# Patient Record
Sex: MALE | Race: BLACK OR AFRICAN AMERICAN | Employment: FULL TIME | URBAN - METROPOLITAN AREA
[De-identification: names, ages, dates, MRNs, and addresses within clinical notes are randomized per-mention and may not be internally consistent; named-entity substitution may affect disease eponyms.]

---

## 2017-10-05 ENCOUNTER — OFFICE VISIT (OUTPATIENT)
Dept: FAMILY MEDICINE CLINIC | Age: 27
End: 2017-10-05

## 2017-10-05 VITALS
DIASTOLIC BLOOD PRESSURE: 72 MMHG | OXYGEN SATURATION: 99 % | WEIGHT: 125 LBS | HEART RATE: 69 BPM | RESPIRATION RATE: 16 BRPM | SYSTOLIC BLOOD PRESSURE: 113 MMHG | TEMPERATURE: 98.9 F | BODY MASS INDEX: 18.51 KG/M2 | HEIGHT: 69 IN

## 2017-10-05 DIAGNOSIS — M54.2 ACUTE NECK PAIN: ICD-10-CM

## 2017-10-05 DIAGNOSIS — M54.6 ACUTE MIDLINE THORACIC BACK PAIN: ICD-10-CM

## 2017-10-05 DIAGNOSIS — S13.4XXA WHIPLASH INJURIES, INITIAL ENCOUNTER: Primary | ICD-10-CM

## 2017-10-05 RX ORDER — DICLOFENAC SODIUM 75 MG/1
75 TABLET, DELAYED RELEASE ORAL
Qty: 30 TAB | Refills: 0 | Status: SHIPPED | OUTPATIENT
Start: 2017-10-05

## 2017-10-05 RX ORDER — CYCLOBENZAPRINE HCL 10 MG
10 TABLET ORAL
Qty: 30 TAB | Refills: 0 | Status: SHIPPED | OUTPATIENT
Start: 2017-10-05 | End: 2017-12-04 | Stop reason: ALTCHOICE
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
## 2017-10-05 NOTE — PATIENT INSTRUCTIONS
Healthy Upper Back: Exercises  Your Care Instructions  Here are some examples of exercises for your upper back. Start each exercise slowly. Ease off the exercise if you start to have pain. Your doctor or physical therapist will tell you when you can start these exercises and which ones will work best for you. How to do the exercises  Lower neck and upper back stretch    1. Stretch your arms out in front of your body. Clasp one hand on top of your other hand. 2. Gently reach out so that you feel your shoulder blades stretching away from each other. 3. Gently bend your head forward. 4. Hold for 15 to 30 seconds. 5. Repeat 2 to 4 times. Midback stretch    Note: If you have knee pain, do not do this exercise. 1. Kneel on the floor, and sit back on your ankles. 2. Lean forward, place your hands on the floor, and stretch your arms out in front of you. Rest your head between your arms. 3. Gently push your chest toward the floor, reaching as far in front of you as possible. 4. Hold for 15 to 30 seconds. 5. Repeat 2 to 4 times. Shoulder rolls    1. Sit comfortably with your feet shoulder-width apart. You can also do this exercise while standing. 2. Roll your shoulders up, then back, and then down in a smooth, circular motion. 3. Repeat 2 to 4 times. Wall push-up    1. Stand against a wall with your feet about 12 to 24 inches back from the wall. If you feel any pain when you do this exercise, stand closer to the wall. 2. Place your hands on the wall slightly wider apart than your shoulders, and lean forward. 3. Gently lean your body toward the wall. Then push back to your starting position. Keep the motion smooth and controlled. 4. Repeat 8 to 12 times. Resisted shoulder blade squeeze    Note: For this exercise, you will need elastic exercise material, such as surgical tubing or Thera-Band. 1. Sit or stand, holding the band in both hands in front of you.  Keep your elbows close to your sides, bent at a 90-degree angle. Your palms should face up. 2. Squeeze your shoulder blades together, and move your arms to the outside, stretching the band. Be sure to keep your elbows at your sides while you do this. 3. Relax. 4. Repeat 8 to 12 times. Resisted rows    Note: For this exercise, you will need elastic exercise material, such as surgical tubing or Thera-Band. 1. Put the band around a solid object, such as a bedpost, at about waist level. Hold one end of the band in each hand. 2. With your elbows at your sides and bent to 90 degrees, pull the band back to move your shoulder blades toward each other. Return to the starting position. 3. Repeat 8 to 12 times. Follow-up care is a key part of your treatment and safety. Be sure to make and go to all appointments, and call your doctor if you are having problems. It's also a good idea to know your test results and keep a list of the medicines you take. Where can you learn more? Go to http://ramon-cathleen.info/. Enter G884 in the search box to learn more about \"Healthy Upper Back: Exercises. \"  Current as of: March 21, 2017  Content Version: 11.3  © 1168-9612 InteliWISE [employer]. Care instructions adapted under license by MakInnovations (which disclaims liability or warranty for this information). If you have questions about a medical condition or this instruction, always ask your healthcare professional. [place] [address] any warranty or liability for your use of this information. Neck Spasm: Exercises  Your Care Instructions  Here are some examples of typical rehabilitation exercises for your condition. Start each exercise slowly. Ease off the exercise if you start to have pain. Your doctor or physical therapist will tell you when you can start these exercises and which ones will work best for you. How to do the exercises  Levator scapula stretch    6. Sit in a firm chair, or stand up straight.   7. Gently tilt your head toward your left shoulder. 8. Turn your head to look down into your armpit, bending your head slightly forward. Let the weight of your head stretch your neck muscles. 9. Hold for 15 to 30 seconds. 10. Return to your starting position. 11. Follow the same instructions above, but tilt your head toward your right shoulder. 12. Repeat 2 to 4 times toward each shoulder. Upper trapezius stretch    6. Sit in a firm chair, or stand up straight. 7. This stretch works best if you keep your shoulder down as you lean away from it. To help you remember to do this, start by relaxing your shoulders and lightly holding on to your thighs or your chair. 8. Tilt your head toward your shoulder and hold for 15 to 30 seconds. Let the weight of your head stretch your muscles. 9. If you would like a little added stretch, place your arm behind your back. Use the arm opposite of the direction you are tilting your head. For example, if you are tilting your head to the left, place your right arm behind your back. 10. Repeat 2 to 4 times toward each shoulder. Neck rotation    4. Sit in a firm chair, or stand up straight. 5. Keeping your chin level, turn your head to the right, and hold for 15 to 30 seconds. 6. Turn your head to the left, and hold for 15 to 30 seconds. 7. Repeat 2 to 4 times to each side. Chin tuck    5. Lie on the floor with a rolled-up towel under your neck. Your head should be touching the floor. 6. Slowly bring your chin toward the front of your neck. 7. Hold for a count of 6, and then relax for up to 10 seconds. 8. Repeat 8 to 12 times. Forward neck flexion    5. Sit in a firm chair, or stand up straight. 6. Bend your head forward. 7. Hold for 15 to 30 seconds, then return to your starting position. 8. Repeat 2 to 4 times. Follow-up care is a key part of your treatment and safety. Be sure to make and go to all appointments, and call your doctor if you are having problems.  It's also a good idea to know your test results and keep a list of the medicines you take. Where can you learn more? Go to http://ramon-cathleen.info/. Enter P962 in the search box to learn more about \"Neck Spasm: Exercises. \"  Current as of: March 21, 2017  Content Version: 11.3  © 6448-4430 TrewCap. Care instructions adapted under license by [employer] Biosciences (which disclaims liability or warranty for this information). If you have questions about a medical condition or this instruction, always ask your healthcare professional. [address] any warranty or liability for your use of this information.

## 2017-10-05 NOTE — LETTER
10/5/2017 3:04 PM 
 
Mr. Vonnie Crouch [address] [address] Please excuse Mr. Samson Rangel from work today due to illness. Sincerely, Glenn Nam NP
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
## 2017-10-16 ENCOUNTER — OFFICE VISIT (OUTPATIENT)
Dept: FAMILY MEDICINE CLINIC | Age: 27
End: 2017-10-16

## 2017-10-16 VITALS
WEIGHT: 127 LBS | HEIGHT: 69 IN | RESPIRATION RATE: 16 BRPM | BODY MASS INDEX: 18.81 KG/M2 | TEMPERATURE: 98.4 F | SYSTOLIC BLOOD PRESSURE: 121 MMHG | DIASTOLIC BLOOD PRESSURE: 81 MMHG | HEART RATE: 57 BPM | OXYGEN SATURATION: 99 %

## 2017-10-16 DIAGNOSIS — M54.2 ACUTE NECK PAIN: ICD-10-CM

## 2017-10-16 DIAGNOSIS — M62.838 CERVICAL PARASPINAL MUSCLE SPASM: Primary | ICD-10-CM

## 2017-10-16 NOTE — PROGRESS NOTES
1. Have you been to the ER, urgent care clinic since your last visit? Hospitalized since your last visit? No    2. Have you seen or consulted any other health care providers outside of the [address] [place] [address] since your last visit? Include any pap smears or colon screening.  No   Chief Complaint   Patient presents with    Follow-up     Last 1280 Johnathan Tesfaye

## 2017-10-16 NOTE — PROGRESS NOTES
Chief Complaint   Patient presents with    Follow-up     Last OV    Back Pain     Upper, Center     he is a 32y.o. year old male who presents for evalution. Pt seen here for 1 wk F/U after last visit. Continues to have pain in upper back and neck. Has been taking muscle relaxer at bedtime but doesn't seem like it is helping. Pt has not been taking NSAIDs. Pt states pain in back can feel sharp and intense, other times feels like pricking or burning sensation. Worse with movements and by end of day. Reviewed PmHx, RxHx, FmHx, SocHx, AllgHx and updated and dated in the chart. Review of Systems - negative except as listed above in the HPI    Objective:     Vitals:    10/16/17 1452   BP: 121/81   Pulse: (!) 57   Resp: 16   Temp: 98.4 °F (36.9 °C)   TempSrc: Oral   SpO2: 99%   Weight: 127 lb (57.6 kg)   Height: 5' 9\" (1.753 m)     Physical Examination: General appearance - alert, well appearing, and in no distress  Neck - supple, no significant adenopathy, movements painful, bilateral trap tightness   Chest - clear to auscultation, no wheezes, rales or rhonchi, symmetric air entry  Heart - normal rate, regular rhythm, normal S1, S2, no murmurs, rubs, clicks or gallops  Back exam - tenderness noted thoracic spine     Assessment/ Plan:   Diagnoses and all orders for this visit:    1. Cervical paraspinal muscle spasm  -     REFERRAL TO PHYSICAL THERAPY  Recommend pt take medication as prescribed and then if no improvement F/U with PT.      2. Acute neck pain  -     REFERRAL TO PHYSICAL THERAPY  Heat or ice, activity modification, gentle stretching. FU prn    Pt voiced understanding regarding plan of care. Follow-up Disposition:  Return if symptoms worsen or fail to improve. I have discussed the diagnosis with the patient and the intended plan as seen in the above orders. The patient has received an after-visit summary and questions were answered concerning future plans.      Medication Side Effects and Warnings were discussed with patient    Alber Reyna NP

## 2017-10-16 NOTE — PATIENT INSTRUCTIONS
[facility] or [facility]  [address] [address]  Ph: [phone]    [facility]   [facility]  Ph: [phone]    [facility]   [address] [address]  Ph: [phone]

## 2017-10-16 NOTE — MR AVS SNAPSHOT
Visit Information Date & Time Provider Department Dept. Phone Encounter #  
 10/16/2017  2:45 PM Shar MARY Mills [address] [facility] [phone] [id number] Follow-up Instructions Return if symptoms worsen or fail to improve. Upcoming Health Maintenance Date Due DTaP/Tdap/Td series (1 - Tdap) 9/11/2009 Allergies as of 10/16/2017  Review Complete On: 10/16/2017 By: Camelia Manley LPN Severity Noted Reaction Type Reactions Shellfish Derived  08/11/2016    Other (comments) Sharp Chest pains Current Immunizations  Never Reviewed No immunizations on file. Not reviewed this visit You Were Diagnosed With   
  
 Codes Comments Cervical paraspinal muscle spasm    -  Primary ICD-10-CM: C37.196 ICD-9-CM: 728.85 Acute neck pain     ICD-10-CM: M54.2 ICD-9-CM: 723.1 Vitals BP Pulse Temp Resp Height(growth percentile) Weight(growth percentile) 121/81 (!) 57 98.4 °F (36.9 °C) (Oral) 16 5' 9\" (1.753 m) 127 lb (57.6 kg) SpO2 BMI Smoking Status 99% 18.75 kg/m2 Never Smoker BMI and BSA Data Body Mass Index Body Surface Area 18.75 kg/m 2 1.67 m 2 Preferred Pharmacy Pharmacy Name Phone [facility] [place] [phone] Your Updated Medication List  
  
   
This list is accurate as of: 10/16/17  3:04 PM.  Always use your most recent med list.  
  
  
  
  
 cyclobenzaprine 10 mg tablet Commonly known as:  FLEXERIL Take 1 Tab by mouth nightly as needed for Muscle Spasm(s). diclofenac EC 75 mg EC tablet Commonly known as:  VOLTAREN Take 1 Tab by mouth two (2) times daily (after meals). We Performed the Following REFERRAL TO PHYSICAL THERAPY [QEJ19 Custom] Follow-up Instructions Return if symptoms worsen or fail to improve. Referral Information Referral ID Referred By Referred To [id number] Fermin Ramírez 45. Not Available Visits Status Start Date End Date 1 New Request 10/16/17 10/16/18 If your referral has a status of pending review or denied, additional information will be sent to support the outcome of this decision. Patient Instructions [facility] or Vipin Hollandmen [address] [place] [address] Ph: [phone] [facility] [address] Ph: [phone] [facility] [address] [facility] [address] Ph: [phone] Introducing [facility] & HEALTH SERVICES! [employer] introduces Pomelo patient portal. Now you can access parts of your medical record, email your doctor's office, and request medication refills online. 1. In your internet browser, go to [email or web address] 2. Click on the First Time User? Click Here link in the Sign In box. You will see the New Member Sign Up page. 3. Enter your Pomelo Access Code exactly as it appears below. You will not need to use this code after youve completed the sign-up process. If you do not sign up before the expiration date, you must request a new code. · Pomelo Access Code: [email or web address] Expires: 1/3/2018  2:58 PM 
 
4. Enter the last four digits of your Social Security Number (xxxx) and Date of Birth (mm/dd/yyyy) as indicated and click Submit. You will be taken to the next sign-up page. 5. Create a StayNToucht ID. This will be your Pomelo login ID and cannot be changed, so think of one that is secure and easy to remember. 6. Create a StayNToucht password. You can change your password at any time. 7. Enter your Password Reset Question and Answer. This can be used at a later time if you forget your password. 8. Enter your e-mail address. You will receive e-mail notification when new information is available in [address] 9. Click Sign Up. You can now view and download portions of your medical record. 10. Click the Download Summary menu link to download a portable copy of your medical information. If you have questions, please visit the Frequently Asked Questions section of the be2 website. Remember, be2 is NOT to be used for urgent needs. For medical emergencies, dial 911. Now available from your iPhone and Android! Please provide this summary of care documentation to your next provider. Your primary care clinician is listed as Jimmy Bennett. If you have any questions after today's visit, please call [phone].

## 2017-11-07 ENCOUNTER — DOCUMENTATION ONLY (OUTPATIENT)
Dept: FAMILY MEDICINE CLINIC | Age: 27
End: 2017-11-07

## 2017-11-07 NOTE — PROGRESS NOTES
Rec'd medical records request from [facility] [employer], faxed request to [employer] for processing on 11/06/17, confirmation rec'd.

## 2017-12-04 ENCOUNTER — OFFICE VISIT (OUTPATIENT)
Dept: FAMILY MEDICINE CLINIC | Age: 27
End: 2017-12-04

## 2017-12-04 VITALS
RESPIRATION RATE: 16 BRPM | DIASTOLIC BLOOD PRESSURE: 73 MMHG | SYSTOLIC BLOOD PRESSURE: 137 MMHG | OXYGEN SATURATION: 99 % | BODY MASS INDEX: 18.81 KG/M2 | WEIGHT: 127 LBS | HEIGHT: 69 IN | TEMPERATURE: 98.7 F | HEART RATE: 55 BPM

## 2017-12-04 DIAGNOSIS — M54.6 ACUTE MIDLINE THORACIC BACK PAIN: Primary | ICD-10-CM

## 2017-12-04 RX ORDER — METHOCARBAMOL 750 MG/1
750 TABLET, FILM COATED ORAL
Qty: 30 TAB | Refills: 1 | Status: SHIPPED | OUTPATIENT
Start: 2017-12-04

## 2017-12-04 RX ORDER — PREDNISONE 10 MG/1
TABLET ORAL
Qty: 1 PACKAGE | Refills: 0 | Status: SHIPPED | OUTPATIENT
Start: 2017-12-04 | End: 2018-05-21 | Stop reason: ALTCHOICE

## 2017-12-04 NOTE — PROGRESS NOTES
Chief Complaint   Patient presents with   [address]     he is a 32y.o. year old male who presents for evalution. Pt has been going to PT twice weekly. Still has some pain in upper back, has been improving but still present. Pt has continued to take Flexeril at bedtime. Is doing exercise at home as well. Pt feels frustrated since still feels pain deep inside, radiates to alternate sides of back at times. Reviewed PmHx, RxHx, FmHx, SocHx, AllgHx and updated and dated in the chart. Review of Systems - negative except as listed above in the HPI    Objective:     Vitals:    12/04/17 1538   BP: 137/73   Pulse: (!) 55   Resp: 16   Temp: 98.7 °F (37.1 °C)   TempSrc: Oral   SpO2: 99%   Weight: 127 lb (57.6 kg)   Height: 5' 9\" (1.753 m)     Physical Examination: General appearance - alert, well appearing, and in no distress  Chest - clear to auscultation, no wheezes, rales or rhonchi, symmetric air entry  Heart - normal rate, regular rhythm, normal S1, S2, no murmurs, rubs, clicks or gallops  Back exam - tenderness noted thoracic spine with paraspinous muscle spasms     Assessment/ Plan:   Diagnoses and all orders for this visit:    1. Acute midline thoracic back pain  -     [facility]  -     predniSONE (STERAPRED DS) 10 mg dose pack; Use as directed on packaging x 12 days  -     methocarbamol (ROBAXIN) 750 mg tablet; Take 1 Tab by mouth three (3) times daily as needed. New rxs. Cont PT. F/U with Ortho for further assessment - should have been improving after 6 wks of PT. Pt voiced understanding regarding plan of care. Follow-up Disposition:  Return if symptoms worsen or fail to improve. I have discussed the diagnosis with the patient and the intended plan as seen in the above orders. The patient has received an after-visit summary and questions were answered concerning future plans.      Medication Side Effects and Warnings were discussed with patient    Kathy Gold, NP

## 2017-12-04 NOTE — PROGRESS NOTES
1. Have you been to the ER, urgent care clinic since your last visit? Hospitalized since your last visit? No    2. Have you seen or consulted any other health care providers outside of the [facility] since your last visit? Include any pap smears or colon screening.  No   Chief Complaint   Patient presents with    Follow-up     Last OV

## 2017-12-04 NOTE — MR AVS SNAPSHOT
Visit Information Date & Time Provider Department Dept. Phone Encounter #  
 12/4/2017  3:30 PM Ariane Andino NP [address] [facility] [phone] [id number] Follow-up Instructions Return if symptoms worsen or fail to improve. Upcoming Health Maintenance Date Due DTaP/Tdap/Td series (1 - Tdap) 9/11/2009 Allergies as of 12/4/2017  Review Complete On: 12/4/2017 By: Alexandr Moreno LPN Severity Noted Reaction Type Reactions Shellfish Derived  08/11/2016    Other (comments) Sharp Chest pains Current Immunizations  Never Reviewed No immunizations on file. Not reviewed this visit You Were Diagnosed With   
  
 Codes Comments Acute midline thoracic back pain    -  Primary ICD-10-CM: M54.6 ICD-9-CM: 724.1 Vitals BP Pulse Temp Resp Height(growth percentile) Weight(growth percentile)  
 137/73 (!) 55 98.7 °F (37.1 °C) (Oral) 16 5' 9\" (1.753 m) 127 lb (57.6 kg) SpO2 BMI Smoking Status 99% 18.75 kg/m2 Never Smoker Vitals History BMI and BSA Data Body Mass Index Body Surface Area 18.75 kg/m 2 1.67 m 2 Preferred Pharmacy Pharmacy Name Phone [facility] [address] [place] [place] [phone] Your Updated Medication List  
  
   
This list is accurate as of: 12/4/17  3:56 PM.  Always use your most recent med list.  
  
  
  
  
 cyclobenzaprine 10 mg tablet Commonly known as:  FLEXERIL Take 1 Tab by mouth nightly as needed for Muscle Spasm(s). diclofenac EC 75 mg EC tablet Commonly known as:  VOLTAREN Take 1 Tab by mouth two (2) times daily (after meals). predniSONE 10 mg dose pack Commonly known as:  STERAPRED DS Use as directed on packaging x 12 days Prescriptions Sent to Pharmacy Refills  
 predniSONE (STERAPRED DS) 10 mg dose pack 0 Sig: Use as directed on packaging x 12 days  Class: Normal  
 Pharmacy: [address] [facility] #: [phone] We Performed the Following REFERRAL TO ORTHOPEDICS [ATV816 Custom] Follow-up Instructions Return if symptoms worsen or fail to improve. Referral Information Referral ID Referred By Referred To  
  
 [id number] [facility] [place] [address] [place], [address] [facility] Phone: [phone] Fax: [phone] Visits Status Start Date End Date 1 New Request 12/4/17 12/4/18 If your referral has a status of pending review or denied, additional information will be sent to support the outcome of this decision. Introducing \[facility]\"" & HEALTH SERVICES! [employer] introduces Omise patient portal. Now you can access parts of your medical record, email your doctor's office, and request medication refills online. 1. In your internet browser, go to [email or web address] 2. Click on the First Time User? Click Here link in the Sign In box. You will see the New Member Sign Up page. 3. Enter your Omise Access Code exactly as it appears below. You will not need to use this code after youve completed the sign-up process. If you do not sign up before the expiration date, you must request a new code. · Omise Access Code: [email or web address] Expires: 1/3/2018  1:58 PM 
 
4. Enter the last four digits of your Social Security Number (xxxx) and Date of Birth (mm/dd/yyyy) as indicated and click Submit. You will be taken to the next sign-up page. 5. Create a K[employer]t ID. This will be your Omise login ID and cannot be changed, so think of one that is secure and easy to remember. 6. Create a Omise password. You can change your password at any time. 7. Enter your Password Reset Question and Answer. This can be used at a later time if you forget your password. 8. Enter your e-mail address.  You will receive e-mail notification when new information is available in ScribeStorm. 9. Click Sign Up. You can now view and download portions of your medical record. 10. Click the Download Summary menu link to download a portable copy of your medical information. If you have questions, please visit the Frequently Asked Questions section of the ScribeStorm website. Remember, ScribeStorm is NOT to be used for urgent needs. For medical emergencies, dial 911. Now available from your iPhone and Android! Please provide this summary of care documentation to your next provider. Your primary care clinician is listed as Aman Martinez. If you have any questions after today's visit, please call [phone].

## 2018-01-17 ENCOUNTER — DOCUMENTATION ONLY (OUTPATIENT)
Dept: FAMILY MEDICINE CLINIC | Age: 28
End: 2018-01-17

## 2018-01-17 NOTE — PROGRESS NOTES
Medical records from Vipin BRIONES was placed on [facility] NP desk to review & sign for scanning.

## 2018-01-22 ENCOUNTER — OFFICE VISIT (OUTPATIENT)
Dept: FAMILY MEDICINE CLINIC | Age: 28
End: 2018-01-22

## 2018-01-22 VITALS
OXYGEN SATURATION: 99 % | BODY MASS INDEX: 19.26 KG/M2 | RESPIRATION RATE: 16 BRPM | WEIGHT: 130 LBS | HEART RATE: 65 BPM | SYSTOLIC BLOOD PRESSURE: 108 MMHG | HEIGHT: 69 IN | TEMPERATURE: 99 F | DIASTOLIC BLOOD PRESSURE: 74 MMHG

## 2018-01-22 DIAGNOSIS — M62.838 CERVICAL PARASPINAL MUSCLE SPASM: Primary | ICD-10-CM

## 2018-01-22 NOTE — PROGRESS NOTES
Chief Complaint   Patient presents with   [address]     he is a 32y.o. year old male who presents for evalution. Pt states has been feeling better from back and neck pain. Did PT which has helped, unsure if needs to keep going. Would like to discuss today. Reviewed PmHx, RxHx, FmHx, SocHx, AllgHx and updated and dated in the chart. Review of Systems - negative except as listed above in the HPI    Objective:     Vitals:    01/22/18 1619   BP: 108/74   Pulse: 65   Resp: 16   Temp: 99 °F (37.2 °C)   TempSrc: Oral   SpO2: 99%   Weight: 130 lb (59 kg)   Height: 5' 9\" (1.753 m)     Physical Examination: General appearance - alert, well appearing, and in no distress  Neck - supple, no significant adenopathy, bilateral trap tightness, nontender   Chest - clear to auscultation, no wheezes, rales or rhonchi, symmetric air entry  Heart - normal rate, regular rhythm, normal S1, S2, no murmurs, rubs, clicks or gallops  Back exam - full range of motion, no tenderness, palpable spasm or pain on motion    Assessment/ Plan:   Diagnoses and all orders for this visit:    1. Cervical paraspinal muscle spasm  Improving. May cont to use meds prn. No need to continue PT at this time, just do exercises at home. F/U prn     Pt voiced understanding regarding plan of care. Follow-up Disposition:  Return if symptoms worsen or fail to improve. I have discussed the diagnosis with the patient and the intended plan as seen in the above orders. The patient has received an after-visit summary and questions were answered concerning future plans.      Medication Side Effects and Warnings were discussed with patient    Ramonita Cole NP

## 2018-01-22 NOTE — PROGRESS NOTES
1. Have you been to the ER, urgent care clinic since your last visit? Hospitalized since your last visit? No    2. Have you seen or consulted any other health care providers outside of the [address] [place] [address] since your last visit? Include any pap smears or colon screening.  No     Chief Complaint   Patient presents with    Follow-up     Last OV

## 2018-01-22 NOTE — MR AVS SNAPSHOT
[address] [address] 
[phone] Patient: Odilon Borrero 
MRN: [id number] APH:1/30/1870 Visit Information Date & Time Provider Department Dept. Phone Encounter #  
 1/22/2018  4:00 PM Henrry Gomez NP [address] [facility] [phone] [id number] Follow-up Instructions Return if symptoms worsen or fail to improve. Upcoming Health Maintenance Date Due DTaP/Tdap/Td series (1 - Tdap) 9/11/2009 Allergies as of 1/22/2018  Review Complete On: 1/22/2018 By: Henrry Gomez NP Severity Noted Reaction Type Reactions Shellfish Derived  08/11/2016    Other (comments) Sharp Chest pains Current Immunizations  Never Reviewed No immunizations on file. Not reviewed this visit You Were Diagnosed With   
  
 Codes Comments Cervical paraspinal muscle spasm    -  Primary ICD-10-CM: S73.563 ICD-9-CM: 728.85 Vitals BP Pulse Temp Resp Height(growth percentile) Weight(growth percentile) 108/74 65 99 °F (37.2 °C) (Oral) 16 5' 9\" (1.753 m) 130 lb (59 kg) SpO2 BMI Smoking Status 99% 19.2 kg/m2 Never Smoker Vitals History BMI and BSA Data Body Mass Index Body Surface Area  
 19.2 kg/m 2 1.69 m 2 Preferred Pharmacy Pharmacy Name Phone [address] [place] [phone] Your Updated Medication List  
  
   
This list is accurate as of: 1/22/18  4:30 PM.  Always use your most recent med list.  
  
  
  
  
 diclofenac EC 75 mg EC tablet Commonly known as:  VOLTAREN Take 1 Tab by mouth two (2) times daily (after meals). methocarbamol 750 mg tablet Commonly known as:  ROBAXIN Take 1 Tab by mouth three (3) times daily as needed. predniSONE 10 mg dose pack Commonly known as:  STERAPRED DS Use as directed on packaging x 12 days Follow-up Instructions Return if symptoms worsen or fail to improve. Introducing [facility] & HEALTH SERVICES! [employer] introduces Hua Kang patient portal. Now you can access parts of your medical record, email your doctor's office, and request medication refills online. 1. In your internet browser, go to [email or web address] 2. Click on the First Time User? Click Here link in the Sign In box. You will see the New Member Sign Up page. 3. Enter your Hua Kang Access Code exactly as it appears below. You will not need to use this code after youve completed the sign-up process. If you do not sign up before the expiration date, you must request a new code. · Hua Kang Access Code: [email or web address] Expires: 4/22/2018  4:30 PM 
 
4. Enter the last four digits of your Social Security Number (xxxx) and Date of Birth (mm/dd/yyyy) as indicated and click Submit. You will be taken to the next sign-up page. 5. Create a Hua Kang ID. This will be your Hua Kang login ID and cannot be changed, so think of one that is secure and easy to remember. 6. Create a Hua Kang password. You can change your password at any time. 7. Enter your Password Reset Question and Answer. This can be used at a later time if you forget your password. 8. Enter your e-mail address. You will receive e-mail notification when new information is available in [address] 9. Click Sign Up. You can now view and download portions of your medical record. 10. Click the Download Summary menu link to download a portable copy of your medical information. If you have questions, please visit the Frequently Asked Questions section of the Hua Kang website. Remember, Hua Kang is NOT to be used for urgent needs. For medical emergencies, dial 911. Now available from your iPhone and Android! Please provide this summary of care documentation to your next provider. Your primary care clinician is listed as Fazal Pardo.  If you have any questions after today's visit, please call [phone].

## 2018-01-22 NOTE — LETTER
1/22/2018 4:29 PM 
 
Mr. Velez Sites [address] [address] To Whom It May Concern,  
 
Mr. Randal Bird was seen and evaluated in office today. His condition has improved and he is feeling better. I have recommended he continue his exercises at home but do not feel at this time he needs additional physical therapy. Sincerely, Concepcion Hawk NP

## 2018-05-03 ENCOUNTER — OFFICE VISIT (OUTPATIENT)
Dept: FAMILY MEDICINE CLINIC | Age: 28
End: 2018-05-03

## 2018-05-03 VITALS
WEIGHT: 130 LBS | OXYGEN SATURATION: 99 % | TEMPERATURE: 98.2 F | HEART RATE: 80 BPM | DIASTOLIC BLOOD PRESSURE: 69 MMHG | BODY MASS INDEX: 19.26 KG/M2 | SYSTOLIC BLOOD PRESSURE: 109 MMHG | RESPIRATION RATE: 16 BRPM | HEIGHT: 69 IN

## 2018-05-03 DIAGNOSIS — H10.021 PINK EYE DISEASE OF RIGHT EYE: Primary | ICD-10-CM

## 2018-05-03 RX ORDER — ERYTHROMYCIN 5 MG/G
OINTMENT OPHTHALMIC
Qty: 1 TUBE | Refills: 0 | Status: SHIPPED | OUTPATIENT
Start: 2018-05-03

## 2018-05-03 NOTE — PROGRESS NOTES
1. Have you been to the ER, urgent care clinic since your last visit? Hospitalized since your last visit? Yes, [facility], 4/30/18    2. Have you seen or consulted any other health care providers outside of the [address] [place] [address] since your last visit? Include any pap smears or colon screening.  No     Chief Complaint   Patient presents with    Itchy Eye     Both, x1 week

## 2018-05-03 NOTE — PROGRESS NOTES
Chief Complaint   Patient presents with   [address] [place] [address], x1 week     he is a 32y.o. year old male who presents for evalution. Pt states wife had pink eye and was treat with Cipro drops. Then pt got sick back in April, used Cipro drops for a week, didn't help. Went to [facility] on Monday and was given Polytrim B but also not helping. Still having pustular discharge, pain at night, drainage all day. Reviewed PmHx, RxHx, FmHx, SocHx, AllgHx and updated and dated in the chart. Review of Systems - negative except as listed above in the HPI    Objective:     Vitals:    05/03/18 1328   BP: 109/69   Pulse: 80   Resp: 16   Temp: 98.2 °F (36.8 °C)   TempSrc: Oral   SpO2: 99%   Weight: 130 lb (59 kg)   Height: 5' 9\" (1.753 m)     Physical Examination: General appearance - alert, well appearing, and in no distress  Eyes - conjunctivitis noted R, slight with pustular drainage   Chest - clear to auscultation, no wheezes, rales or rhonchi, symmetric air entry  Heart - normal rate, regular rhythm, normal S1, S2, no murmurs, rubs, clicks or gallops    Assessment/ Plan:   Diagnoses and all orders for this visit:    1. Pink eye disease of right eye  -     erythromycin (ILOTYCIN) ophthalmic ointment; Use thin ribbon in eye 5 times daily for 10 days  New rx. Disinfect surfaces once feeling better, easy to keep giving to yourself. F/U prn    Pt voiced understanding regarding plan of care. Follow-up Disposition:  Return if symptoms worsen or fail to improve. I have discussed the diagnosis with the patient and the intended plan as seen in the above orders. The patient has received an after-visit summary and questions were answered concerning future plans.      Medication Side Effects and Warnings were discussed with patient    Sandro Rhodes NP

## 2018-05-03 NOTE — PATIENT INSTRUCTIONS
Pinkeye: Care Instructions  Your Care Instructions    Pinkeye is redness and swelling of the eye surface and the conjunctiva (the lining of the eyelid and the covering of the white part of the eye). Pinkeye is also called conjunctivitis. Pinkeye is often caused by infection with bacteria or a virus. Dry air, allergies, smoke, and chemicals are other common causes. Pinkeye often clears on its own in 7 to 10 days. Antibiotics only help if the pinkeye is caused by bacteria. Pinkeye caused by infection spreads easily. If an allergy or chemical is causing pinkeye, it will not go away unless you can avoid whatever is causing it. Follow-up care is a key part of your treatment and safety. Be sure to make and go to all appointments, and call your doctor if you are having problems. It's also a good idea to know your test results and keep a list of the medicines you take. How can you care for yourself at home? · Wash your hands often. Always wash them before and after you treat pinkeye or touch your eyes or face. · Use moist cotton or a clean, wet cloth to remove crust. Wipe from the inside corner of the eye to the outside. Use a clean part of the cloth for each wipe. · Put cold or warm wet cloths on your eye a few times a day if the eye hurts. · Do not wear contact lenses or eye makeup until the pinkeye is gone. Throw away any eye makeup you were using when you got pinkeye. Clean your contacts and storage case. If you wear disposable contacts, use a new pair when your eye has cleared and it is safe to wear contacts again. · If the doctor gave you antibiotic ointment or eyedrops, use them as directed. Use the medicine for as long as instructed, even if your eye starts looking better soon. Keep the bottle tip clean, and do not let it touch the eye area. · To put in eyedrops or ointment:  ¨ Tilt your head back, and pull your lower eyelid down with one finger.   ¨ Drop or squirt the medicine inside the lower lid.  ¨ Close your eye for 30 to 60 seconds to let the drops or ointment move around. ¨ Do not touch the ointment or dropper tip to your eyelashes or any other surface. · Do not share towels, pillows, or washcloths while you have pinkeye. When should you call for help? Call your doctor now or seek immediate medical care if:  ? · You have pain in your eye, not just irritation on the surface. ? · You have a change in vision or loss of vision. ? · You have an increase in discharge from the eye.   ? · Your eye has not started to improve or begins to get worse within 48 hours after you start using antibiotics. ? · Pinkeye lasts longer than 7 days. ? Watch closely for changes in your health, and be sure to contact your doctor if you have any problems. Where can you learn more? Go to http://ramon-cathleen.info/. Enter Y392 in the search box to learn more about \"Pinkeye: Care Instructions. \"  Current as of: March 20, 2017  Content Version: 11.4  © 0652-5650 Healthwise, Incorporated. Care instructions adapted under license by [employer] (which disclaims liability or warranty for this information). If you have questions about a medical condition or this instruction, always ask your healthcare professional. [address] any warranty or liability for your use of this information.

## 2018-05-03 NOTE — LETTER
5/3/2018 1:36 PM 
 
Mr. Maria E Soni [address] [address] Please excuse Mr. Gabriel Benitez from work today and tomorrow due to illness. Sincerely, Chacha Romo NP

## 2018-05-21 ENCOUNTER — OFFICE VISIT (OUTPATIENT)
Dept: FAMILY MEDICINE CLINIC | Age: 28
End: 2018-05-21

## 2018-05-21 VITALS
SYSTOLIC BLOOD PRESSURE: 105 MMHG | RESPIRATION RATE: 18 BRPM | TEMPERATURE: 98.2 F | BODY MASS INDEX: 19.34 KG/M2 | HEIGHT: 69 IN | OXYGEN SATURATION: 98 % | HEART RATE: 63 BPM | WEIGHT: 130.56 LBS | DIASTOLIC BLOOD PRESSURE: 70 MMHG

## 2018-05-21 DIAGNOSIS — M62.838 CERVICAL PARASPINAL MUSCLE SPASM: ICD-10-CM

## 2018-05-21 DIAGNOSIS — G89.29 CHRONIC NECK PAIN: Primary | ICD-10-CM

## 2018-05-21 DIAGNOSIS — M54.2 CHRONIC NECK PAIN: Primary | ICD-10-CM
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
## 2018-05-21 NOTE — MR AVS SNAPSHOT
[address] [address] 
[phone] Patient: Elyse Hinds 
MRN: [id number] LIDIA:[birth year] Visit Information Date & Time Provider Department Dept. Phone Encounter #  
 5/21/2018  3:30 PM Candy Eagle NP [facility] [phone] [id number] Follow-up Instructions Return if symptoms worsen or fail to improve. Upcoming Health Maintenance Date Due DTaP/Tdap/Td series (1 - Tdap) 9/11/2009 Influenza Age 5 to Adult 8/1/2018 Allergies as of 5/21/2018  Review Complete On: 5/21/2018 By: Lilia Sauceda LPN Severity Noted Reaction Type Reactions Shellfish Derived  08/11/2016    Other (comments) Sharp Chest pains Current Immunizations  Never Reviewed No immunizations on file. Not reviewed this visit You Were Diagnosed With   
  
 Codes Comments Chronic neck pain    -  Primary ICD-10-CM: M54.2, G89.29 ICD-9-CM: 723.1, 338.29 Cervical paraspinal muscle spasm     ICD-10-CM: N98.628 ICD-9-CM: 728.85 Vitals BP Pulse Temp Resp Height(growth percentile) Weight(growth percentile) 105/70 (BP 1 Location: Left arm, BP Patient Position: Sitting) 63 98.2 °F (36.8 °C) (Oral) 18 5' 9\" (1.753 m) 130 lb 9 oz (59.2 kg) SpO2 BMI Smoking Status 98% 19.28 kg/m2 Never Smoker Vitals History BMI and BSA Data Body Mass Index Body Surface Area  
 19.28 kg/m 2 1.7 m 2 Preferred Pharmacy Pharmacy Name Phone [address] [place] [phone] Your Updated Medication List  
  
   
This list is accurate as of 5/21/18  3:53 PM.  Always use your most recent med list.  
  
  
  
  
 diclofenac EC 75 mg EC tablet Commonly known as:  VOLTAREN Take 1 Tab by mouth two (2) times daily (after meals). erythromycin ophthalmic ointment Commonly known as:  ILOTYCIN  
 Use thin ribbon in eye 5 times daily for 10 days  
  
 methocarbamol 750 mg tablet Commonly known as:  ROBAXIN Take 1 Tab by mouth three (3) times daily as needed. We Performed the Following REFERRAL TO PHYSICAL THERAPY [WUP32 Custom] Comments:  
 Please add on dry needling if indicated Follow-up Instructions Return if symptoms worsen or fail to improve. Referral Information Referral ID Referred By Referred To  
  
 [id number] Thadedus ARMAS Not Available Visits Status Start Date End Date 1 New Request 5/21/18 5/21/19 If your referral has a status of pending review or denied, additional information will be sent to support the outcome of this decision. Introducing [facility]! [facility] introduces SeekSherpa patient portal. Now you can access parts of your medical record, email your doctor's office, and request medication refills online. 1. In your internet browser, go to [email or web address] 2. Click on the First Time User? Click Here link in the Sign In box. You will see the New Member Sign Up page. 3. Enter your SeekSherpa Access Code exactly as it appears below. You will not need to use this code after youve completed the sign-up process. If you do not sign up before the expiration date, you must request a new code. · SeekSherpa Access Code: [email or web address] Expires: 8/1/2018  1:36 PM 
 
4. Enter the last four digits of your Social Security Number (xxxx) and Date of Birth (mm/dd/yyyy) as indicated and click Submit. You will be taken to the next sign-up page. 5. Create a BankFacilt ID. This will be your BankFacilt login ID and cannot be changed, so think of one that is secure and easy to remember. 6. Create a BankFacilt password. You can change your password at any time. 7. Enter your Password Reset Question and Answer. This can be used at a later time if you forget your password. 8. Enter your e-mail address. You will receive e-mail notification when new information is available in [address] 9. Click Sign Up. You can now view and download portions of your medical record. 10. Click the Download Summary menu link to download a portable copy of your medical information. If you have questions, please visit the Frequently Asked Questions section of the WeShow website. Remember, WeShow is NOT to be used for urgent needs. For medical emergencies, dial 911. Now available from your iPhone and Android! Please provide this summary of care documentation to your next provider. Your primary care clinician is listed as Rain Olvera. If you have any questions after today's visit, please call [phone].
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
## 2018-05-31 ENCOUNTER — DOCUMENTATION ONLY (OUTPATIENT)
Dept: FAMILY MEDICINE CLINIC | Age: 28
End: 2018-05-31

## 2018-05-31 NOTE — PROGRESS NOTES
Harmeet Edge request for medical records was faxed to [facility] [phone] to be processed.

## 2019-03-26 ENCOUNTER — DOCUMENTATION ONLY (OUTPATIENT)
Dept: FAMILY MEDICINE CLINIC | Age: 29
End: 2019-03-26

## 2019-03-26 NOTE — PROGRESS NOTES
Lainey Meade for medical records was faxed 3/25/19 to [address] [place] [address] to be processed

## 2019-04-23 ENCOUNTER — OFFICE VISIT (OUTPATIENT)
Dept: FAMILY MEDICINE CLINIC | Age: 29
End: 2019-04-23

## 2019-04-23 VITALS
DIASTOLIC BLOOD PRESSURE: 80 MMHG | SYSTOLIC BLOOD PRESSURE: 110 MMHG | HEIGHT: 69 IN | WEIGHT: 126 LBS | RESPIRATION RATE: 14 BRPM | HEART RATE: 58 BPM | TEMPERATURE: 98 F | BODY MASS INDEX: 18.66 KG/M2 | OXYGEN SATURATION: 99 %

## 2019-04-23 DIAGNOSIS — Z00.00 WELL ADULT EXAM: Primary | ICD-10-CM

## 2019-04-23 NOTE — PROGRESS NOTES
Subjective:   Heber Handley II is a 25 y.o. male presenting for his annual checkup. ROS:  Feeling well. No dyspnea or chest pain on exertion. No abdominal pain, change in bowel habits, black or bloody stools. No urinary tract or prostatic symptoms. No neurological complaints. Specific concerns today: none. There are no active problems to display for this patient. Current Outpatient Medications   Medication Sig Dispense Refill    erythromycin (ILOTYCIN) ophthalmic ointment Use thin ribbon in eye 5 times daily for 10 days 1 Tube 0    methocarbamol (ROBAXIN) 750 mg tablet Take 1 Tab by mouth three (3) times daily as needed. 30 Tab 1    diclofenac EC (VOLTAREN) 75 mg EC tablet Take 1 Tab by mouth two (2) times daily (after meals). 30 Tab 0     History reviewed. No pertinent family history. Social History     Tobacco Use    Smoking status: Never Smoker    Smokeless tobacco: Never Used   Substance Use Topics    Alcohol use: No         Objective:     Visit Vitals  /80 (BP 1 Location: Left arm, BP Patient Position: Sitting)   Pulse (!) 58   Temp 98 °F (36.7 °C) (Oral)   Resp 14   Ht 5' 9\" (1.753 m)   Wt 126 lb (57.2 kg)   SpO2 99%   BMI 18.61 kg/m²     The patient appears well, alert, oriented x 3, in no distress. ENT normal.  Neck supple. No adenopathy or thyromegaly. LALITA. Lungs are clear, good air entry, no wheezes, rhonchi or rales. S1 and S2 normal, no murmurs, regular rate and rhythm. Abdomen is soft without tenderness, guarding, mass or organomegaly.  exam: deferred. Extremities show no edema, normal peripheral pulses. Neurological is normal without focal findings. Assessment/Plan:   healthy adult male  lose weight, increase physical activity, follow low fat diet, follow low salt diet, routine labs ordered. Encounter Diagnoses   Name Primary?     Well adult exam Yes     Orders Placed This Encounter    LIPID PANEL    CBC WITH AUTOMATED DIFF    METABOLIC PANEL, COMPREHENSIVE  TSH 3RD GENERATION   . I have discussed the diagnosis with the patient and the intended plan as seen in the above orders. The patient has received an after-visit summary and questions were answered concerning future plans. Patient conveyed understanding of the plan at the time of the visit.     Beryle Cocks, MSN, ANP  4/23/2019

## 2019-04-23 NOTE — PROGRESS NOTES
Chief Complaint   Patient presents with    Complete Physical    Labs     Pt in office today for cpe/labs  Pt states he has not fasted  Pt would like to know if he can find out his blood type  Pt states he normally sees lyndsey and would like to schedule and appt to see her but will see provider today and do what they can    Pt has no other concerns

## 2019-04-24 LAB
ALBUMIN SERPL-MCNC: 4.6 G/DL (ref 3.5–5.5)
ALBUMIN/GLOB SERPL: 1.9 {RATIO} (ref 1.2–2.2)
ALP SERPL-CCNC: 52 IU/L (ref 39–117)
ALT SERPL-CCNC: 11 IU/L (ref 0–44)
AST SERPL-CCNC: 16 IU/L (ref 0–40)
BASOPHILS # BLD AUTO: 0 X10E3/UL (ref 0–0.2)
BASOPHILS NFR BLD AUTO: 1 %
BILIRUB SERPL-MCNC: 1.1 MG/DL (ref 0–1.2)
BUN SERPL-MCNC: 6 MG/DL (ref 6–20)
BUN/CREAT SERPL: 8 (ref 9–20)
CALCIUM SERPL-MCNC: 9.5 MG/DL (ref 8.7–10.2)
CHLORIDE SERPL-SCNC: 101 MMOL/L (ref 96–106)
CHOLEST SERPL-MCNC: 102 MG/DL (ref 100–199)
CO2 SERPL-SCNC: 26 MMOL/L (ref 20–29)
CREAT SERPL-MCNC: 0.8 MG/DL (ref 0.76–1.27)
EOSINOPHIL # BLD AUTO: 0.5 X10E3/UL (ref 0–0.4)
EOSINOPHIL NFR BLD AUTO: 11 %
ERYTHROCYTE [DISTWIDTH] IN BLOOD BY AUTOMATED COUNT: 13 % (ref 12.3–15.4)
GLOBULIN SER CALC-MCNC: 2.4 G/DL (ref 1.5–4.5)
GLUCOSE SERPL-MCNC: 83 MG/DL (ref 65–99)
HCT VFR BLD AUTO: 39.1 % (ref 37.5–51)
HDLC SERPL-MCNC: 42 MG/DL
HGB BLD-MCNC: 13 G/DL (ref 13–17.7)
IMM GRANULOCYTES # BLD AUTO: 0 X10E3/UL (ref 0–0.1)
IMM GRANULOCYTES NFR BLD AUTO: 0 %
INTERPRETATION, 910389: NORMAL
LDLC SERPL CALC-MCNC: 49 MG/DL (ref 0–99)
LYMPHOCYTES # BLD AUTO: 2.1 X10E3/UL (ref 0.7–3.1)
LYMPHOCYTES NFR BLD AUTO: 42 %
MCH RBC QN AUTO: 29.6 PG (ref 26.6–33)
MCHC RBC AUTO-ENTMCNC: 33.2 G/DL (ref 31.5–35.7)
MCV RBC AUTO: 89 FL (ref 79–97)
MONOCYTES # BLD AUTO: 0.3 X10E3/UL (ref 0.1–0.9)
MONOCYTES NFR BLD AUTO: 5 %
NEUTROPHILS # BLD AUTO: 2.1 X10E3/UL (ref 1.4–7)
NEUTROPHILS NFR BLD AUTO: 41 %
PLATELET # BLD AUTO: 163 X10E3/UL (ref 150–379)
POTASSIUM SERPL-SCNC: 4.6 MMOL/L (ref 3.5–5.2)
PROT SERPL-MCNC: 7 G/DL (ref 6–8.5)
RBC # BLD AUTO: 4.39 X10E6/UL (ref 4.14–5.8)
SODIUM SERPL-SCNC: 141 MMOL/L (ref 134–144)
TRIGL SERPL-MCNC: 55 MG/DL (ref 0–149)
TSH SERPL DL<=0.005 MIU/L-ACNC: 1.13 UIU/ML (ref 0.45–4.5)
VLDLC SERPL CALC-MCNC: 11 MG/DL (ref 5–40)
WBC # BLD AUTO: 5 X10E3/UL (ref 3.4–10.8)

## 2019-04-25 ENCOUNTER — DOCUMENTATION ONLY (OUTPATIENT)
Dept: FAMILY MEDICINE CLINIC | Age: 29
End: 2019-04-25

## 2019-04-25 NOTE — PROGRESS NOTES
Law Office [address] [place] [address] certification letter was faxed to Edwin Osman on 4/11/19 @ [phone]

## 2020-03-09 ENCOUNTER — OFFICE VISIT (OUTPATIENT)
Dept: FAMILY MEDICINE CLINIC | Age: 30
End: 2020-03-09

## 2020-03-09 VITALS
HEIGHT: 69 IN | TEMPERATURE: 98.2 F | DIASTOLIC BLOOD PRESSURE: 62 MMHG | HEART RATE: 73 BPM | OXYGEN SATURATION: 97 % | RESPIRATION RATE: 16 BRPM | WEIGHT: 127.2 LBS | BODY MASS INDEX: 18.84 KG/M2 | SYSTOLIC BLOOD PRESSURE: 98 MMHG

## 2020-03-09 DIAGNOSIS — M79.674 CHRONIC TOE PAIN, RIGHT FOOT: Primary | ICD-10-CM

## 2020-03-09 DIAGNOSIS — G89.29 CHRONIC TOE PAIN, RIGHT FOOT: Primary | ICD-10-CM

## 2020-03-09 NOTE — PROGRESS NOTES
Chief Complaint   Patient presents with    Toe Injury     2nd toe Right foot:  X 3 days     1. Have you been to the ER, urgent care clinic since your last visit? Hospitalized since your last visit? No    2. Have you seen or consulted any other health care providers outside of the [address] [place] [address] since your last visit? Include any pap smears or colon screening. No    Chief Complaint   Patient presents with    Toe Injury     2nd toe Right foot:  X 3 days     He is a 30 y.o. male who presents for evalution. Reviewed PmHx, RxHx, FmHx, SocHx, AllgHx and updated and dated in the chart. There are no active problems to display for this patient. Review of Systems - negative except as listed above in the HPI    Objective:     Vitals:    03/09/20 1558   BP: 98/62   Pulse: 73   Resp: 16   Temp: 98.2 °F (36.8 °C)   TempSrc: Oral   SpO2: 97%   Weight: 127 lb 3.2 oz (57.7 kg)   Height: 5' 9\" (1.753 m)     Physical Examination: General appearance - alert, well appearing, and in no distress  R second toe swollen and tender      Assessment/ Plan:   Diagnoses and all orders for this visit:    1. Chronic toe pain, right foot  -     XR 2ND TOE RT MIN 2 V; Future  -protect  -advil for pain           I have discussed the diagnosis with the patient and the intended plan as seen in the above orders. The patient understands and agrees with the plan. The patient has received an after-visit summary and questions were answered concerning future plans. Medication Side Effects and Warnings were discussed with patient  Patient Labs were reviewed and or requested:  Patient Past Records were reviewed and or requested    Carlin Allen M.D. There are no Patient Instructions on file for this visit.

## 2020-03-12 ENCOUNTER — HOSPITAL ENCOUNTER (OUTPATIENT)
Dept: GENERAL RADIOLOGY | Age: 30
Discharge: HOME | End: 2020-03-12
Attending: FAMILY MEDICINE
Payer: COMMERCIAL

## 2020-03-12 DIAGNOSIS — G89.29 CHRONIC TOE PAIN, RIGHT FOOT: ICD-10-CM

## 2020-03-12 DIAGNOSIS — M79.674 CHRONIC TOE PAIN, RIGHT FOOT: ICD-10-CM

## 2020-03-12 PROCEDURE — 73660 X-RAY EXAM OF TOE(S): CPT

## 2020-03-16 NOTE — PROGRESS NOTES
LM on VM- small fx & use ana taping & protect as discussed. Occupational Therapy Daily Treatment       Visit Count: 9  Referred by: Dr. Avni Feliciano MD     Provider Follow Up Visit: 6/30/2017     Plan of Care: 5/1/2017-8/1/2017     Medical Diagnosis (from order): Rupture of distal biceps tendon, left, subsequent encounter [S44.778V]      Treatment Diagnosis: Elbow Symptoms with Pain, Impaired Range of Motion and Impaired Motor Function/Muscle Performance  Primary Insurance: WC-PAYOR UNKNOWN  Secondary Insurance: N/A     Requirements: Work Injury Information:   Current Employer: [employer]   : [employer]   Restrictions: Wear elbow brace locked to 90 degrees extension, no left hand work   Full Duty Work Demands: medium (20 - 50 lbs), sitting >50% of the day, standing >50% of the day, computer/office work, chest height lifting, lifting from floor, rotational/twisting motions , work below knee level, work overhead and office cleaning equipment   Current Work Status: full time occupation: ; restricted duty due to current condition      WORK COMP INFORMATION:  WORK COMP CARRIER: JANICE BOOGIE  CARRIER PHONE: [phone]  : DEBRA LAU   PHONE: [phone]  DIAGNOSIS: LEFT BICEP  DATE OF INJURY: 2/2/2017  CLAIM #: [id number]  STATUS: OPEN     Date of Onset: new onset; 2/22/2017      Date of Surgery: 2/22/2007; surgery performed: left distal bicep tendon repair; rehabilitation guidelines: MD orders     Post Op:  3/21/17: 4 weeks  4/18/17: 8 weeks  5/2/17: 10 weeks  5/16/17: 12 weeks     No strengthening until 12 weeks post op        Diagnosis Precautions: No strengthening until 12 weeks post-op  no left hand work      Relevant co-morbidities and medications: ibuprofen      Relevant Tests: Relevant diagnostic tests: plain film radiograph, MRI     Medical/surgical history, medications and relevant tests have been reviewed.       Case Notes/Attendance:  Contact with Adjustor /     :  DEBRA LAU   PHONE: [phone]    : Irina Hoyt, [phone]. Fax [phone]  · Date of Communication: 5/24/2017; Results: provided status update, estimated range of visits to 8-14 additional visits, however it could be greater than that  Attendance Concerns: none at this time     SUBJECTIVE   On the day of injury, while at work, the patient was pushing a desk when he felt a sharp and sudden pain in his left distal bicep.     Surgical repair was indicated.    (Cont)Reports MD left him on 4 hour shifts and no use of left arm until 6/12/16 - he can return to 8 hours but continued no use of left arm until seen again by MD 6/30/17    Current Pain: 3/10.      Functional Change: \"It has just been sore the past couple of days. No questions about the exercises.\"      OBJECTIVE   Hand Dominance  Right     Posture/Observation:  Patient arrived in clinic with his hinged elbow brace locked to 90 degrees flexion  Increased edema dorsum of hand and fingers  Incision is slightly raised with adherence at the proximal portion of the incision/scar     Range of Motion (degrees): Active Range of Motion  [] All motions within functional/normal limits except those noted.   [x] Only those motions that were assessed are noted.   [] Uninvolved motion within functional/normal limits.    Norm Left Right Left Left Left Left   Date   Initial Initial 5/5/17 5/8/17 5/17/17 5/31/17   ELBOW              Flexion 140-150°  125 135   130 130 134   Extension 0-10°  50 0 25  25 20 9   FOREARM              Supination 90°  0-35 0-75    0-70 0-77   Pronation 90° 0-85 0-85        WRIST              Flexion 80° 0-35 0-70     0-65   Extension 70° 0-60 0-60        Radial Deviation 20°            Ulnar Deviation 45°            [standard testing positions unless otherwise noted]  Comments: Increased tightness and discomfort with left UE ROM; * denotes pain      Strength: (out of 5)  [] Gross muscle strength within  functional/normal limits except as noted.  [x] Only muscle strength that was assessed are noted.  [] Uninvolved muscle strength within functional/normal limits.    Left Right Left   Date Initial Initial     ELBOW         Flexion NT 5     Extension   5     FOREARM         Supination    5     Pronation         [standard testing positions unless otherwise noted]  Comments: left not tested due to recent surgery; * denotes pain       Strength: /Pinch    Left Right Left Right Left   Date Initial Initial 5/22/17 5/22/17 5/31/17              Trial 1/2/3 NT 95,95,90  25,25,20 85,85,80 40,40,35   Norm         [standard testing positions unless otherwise noted]  Comments: Measured in pounds of force, Left not tested due to recent surgery.; *denotes pain         Palpation:  Tenderness over the incision/scar  Tightness in the bicep muscle belly  Soft end feel at end range of elbow extension and supination     Outcome Measures:   DASH: (Initial) 25 (scored 0-100; a higher score indicates greater disability)       Functional Status:   Functional Activity Patient  Abilities  Date:  Job Demands  Employer Report   Lift floor to waist  35#   Lift waist to chest  35#   Lift waist to overhead  35#   Two Hand Carry  35#   Push/Pull  35#   Sustained overhead work     Sustained sitting     Sustained standing     Sustained kneeling      Areas blank above not yet assessed due to not medically appropriate.  Will be assessed when appropriate.    Use of special tools or equipment requirements: none at this time      Treatment   Therapeutic Exercise:   Measured ROM,  strength   AAROM/AROM elbow extension  BTE: tool 802: 67\" height. Push/pull overhead 2# 15x2  BTE: Tool 601: supination/pronation 2# 2x10  Extra-light t-band horizontal abduction x10. Education provided verbally, with demonstration. Patient declined handout      Manual:  STM biceps and proximal forearm  Scar massage    Modalities:  Patient has been made aware  of potential contraindications and possible risks associated with the use of the following modalities and has agreed.  Ultrasound (82302): with hot pack at bicep  Location:  proximal forearm; Position: supine; Duration: 10 minutes. Intensity: 1.2 W/cm2, Duty Cycle: 100% duty cycle; Frequency: 1 Mhz.   Modality treatment resulted in a patient reported decrease in tightness. Patient reports no adverse reaction to treatment.     Home Exercise Program:  AAROM elbow extension, AAROM elbow flexion, AAROM supination, use of ice, shoulder AROM, AROM/AAROM wrist flexion and extension, ultrasound, BTE for exercise, measure ROM and       ASSESSMENT   Improved ROM and  strength. Goal met for ROM    Pain after treatment: 1/10. \"I feel much better now than when I came in.\"    Patient to add another band to the ergogripper.    Pain reduced after US and activity.    Patient's concerns about return to work: none at this time     Result of above outlined education: Verbalizes understanding and Demonstrates understanding    Goals:       To be obtained by end of this plan of care: 24 visits     1. Patient independent with modified and progressed home exercise program.  2. Patient will report pain as 2/10 while performing lifting,carrying,pushing/pulling up to 35 pounds and using cleaning equipment  in order to improve his work performance, work tolerance, and safety.  3. Patient will increase left elbow ROM: flexion to 130 met, extension to 10, forearm supination 0-70 met in order to perform motions required to clean surfaces at multiple levels, including over head, and distances.  ROM goals met  4. Patient will increase left elbow strength: flexion to 5/5, extension to 5/5, forearm supination 5/5, and left  strength to 70 pounds in order to improve the patient's ability to lift overhead, push/pull, carry 35 pounds, and utilize heavy cleaning equipment..   5. Patient will demonstrate improved UE function as indicated by a  score a 10 or less or improve by greater than 15 points on their discharge DASH outcome score.    Goal status: progressing/ongoing    PLAN   Plan for next session: Ultrasound with hot pack over bicep, AAROM/PROM elbow flexion/extension and forearm supination/pronation,  AROM wrist flexion/extension, hand PRE, elbow flexion and extension with resistance (band, weight, BTE), scapular exercise    THERAPY DAILY BILLING   [facility]  Secondary Insurance: N/A    Evaluation Procedures:  No evaluation codes were used on this date of service    Timed Procedures:  Manual Therapy, 15 minutes  Therapeutic Exercise, 20 minutes  Ultrasound, 10 minutes    Untimed Procedures:  No untimed codes were used on this date of service    Total Treatment Time: 45 minutes

## 2020-06-03 ENCOUNTER — VIRTUAL VISIT (OUTPATIENT)
Dept: FAMILY MEDICINE CLINIC | Age: 30
End: 2020-06-03

## 2020-06-03 DIAGNOSIS — J45.20 MILD INTERMITTENT ASTHMA WITHOUT COMPLICATION: Primary | ICD-10-CM

## 2020-06-03 RX ORDER — ALBUTEROL SULFATE 90 UG/1
2 AEROSOL, METERED RESPIRATORY (INHALATION)
Qty: 1 INHALER | Refills: 5 | Status: SHIPPED | OUTPATIENT
Start: 2020-06-03

## 2020-06-03 NOTE — PROGRESS NOTES
Hua Mendoza II is a 30 y.o. male   Chief Complaint   Patient presents with    Asthma   Pt states he has had a couple asthma attacks this year and has had to use his brothers inhaler since he does not have his own. Pt would also like a note saying he is high risk for covid and this has been written. he is a 30y.o. year old male who presents for evalution. Reviewed PmHx, RxHx, FmHx, SocHx, AllgHx and updated and dated in the chart. Review of Systems - negative except as listed above in the HPI    Objective: There were no vitals filed for this visit. Current Outpatient Medications   Medication Sig    albuterol (PROVENTIL HFA, VENTOLIN HFA, PROAIR HFA) 90 mcg/actuation inhaler Take 2 Puffs by inhalation every four (4) hours as needed for Wheezing or Shortness of Breath. Whichever brand insurance prefers    erythromycin (ILOTYCIN) ophthalmic ointment Use thin ribbon in eye 5 times daily for 10 days    methocarbamol (ROBAXIN) 750 mg tablet Take 1 Tab by mouth three (3) times daily as needed.  diclofenac EC (VOLTAREN) 75 mg EC tablet Take 1 Tab by mouth two (2) times daily (after meals). No current facility-administered medications for this visit. Physical Examination: General appearance - alert, well appearing, and in no distress  Mental status - alert, oriented to person, place, and time  Chest - norm resp effort      Assessment/ Plan:   Diagnoses and all orders for this visit:    1. Mild intermittent asthma without complication  -     albuterol (PROVENTIL HFA, VENTOLIN HFA, PROAIR HFA) 90 mcg/actuation inhaler; Take 2 Puffs by inhalation every four (4) hours as needed for Wheezing or Shortness of Breath. Whichever brand insurance prefers       Follow-up and Dispositions    · Return if symptoms worsen or fail to improve. I have discussed the diagnosis with the patient and the intended plan as seen in the above orders.   The patient has received an after-visit summary and questions were answered concerning future plans. Pt conveyed understanding of plan. Medication Side Effects and Warnings were discussed with patient      Antony Brown DO         Kendall Kendrick II is a 30 y.o. male being evaluated by a Virtual Visit (video visit) encounter to address concerns as mentioned above. A caregiver was present when appropriate. Due to this being a TeleHealth encounter (During VOTLY-07 public health emergency), evaluation of the following organ systems was limited: Vitals/Constitutional/EENT/Resp/CV/GI//MS/Neuro/Skin/Heme-Lymph-Imm. Pursuant to the emergency declaration under the [address] [place] [address], [address] [place] [address] authority and the [employer] and [employer] Act, this Virtual Visit was conducted with patient's (and/or legal guardian's) consent, to reduce the risk of exposure to COVID-19 and provide necessary medical care. Services were provided through a video synchronous discussion virtually to substitute for in-person encounter. --Antony Brown DO on 6/3/2020 at 2:33 PM    An electronic signature was used to authenticate this note.

## 2020-06-03 NOTE — PATIENT INSTRUCTIONS
A Healthy Lifestyle: Care Instructions Your Care Instructions A healthy lifestyle can help you feel good, stay at a healthy weight, and have plenty of energy for both work and play. A healthy lifestyle is something you can share with your whole family. A healthy lifestyle also can lower your risk for serious health problems, such as high blood pressure, heart disease, and diabetes. You can follow a few steps listed below to improve your health and the health of your family. Follow-up care is a key part of your treatment and safety. Be sure to make and go to all appointments, and call your doctor if you are having problems. It's also a good idea to know your test results and keep a list of the medicines you take. How can you care for yourself at home? · Do not eat too much sugar, fat, or fast foods. You can still have dessert and treats now and then. The goal is moderation. · Start small to improve your eating habits. Pay attention to portion sizes, drink less juice and soda pop, and eat more fruits and vegetables. ? Eat a healthy amount of food. A 3-ounce serving of meat, for example, is about the size of a deck of cards. Fill the rest of your plate with vegetables and whole grains. ? Limit the amount of soda and sports drinks you have every day. Drink more water when you are thirsty. ? Eat at least 5 servings of fruits and vegetables every day. It may seem like a lot, but it is not hard to reach this goal. A serving or helping is 1 piece of fruit, 1 cup of vegetables, or 2 cups of leafy, raw vegetables. Have an apple or some carrot sticks as an afternoon snack instead of a candy bar. Try to have fruits and/or vegetables at every meal. 
· Make exercise part of your daily routine. You may want to start with simple activities, such as walking, bicycling, or slow swimming. Try to be active 30 to 60 minutes every day.  You do not need to do all 30 to 60 minutes all at once. For example, you can exercise 3 times a day for 10 or 20 minutes. Moderate exercise is safe for most people, but it is always a good idea to talk to your doctor before starting an exercise program. 
· Keep moving. [place] the lawn, work in the garden, or [employer]. Take the stairs instead of the elevator at work. · If you smoke, quit. People who smoke have an increased risk for heart attack, stroke, cancer, and other lung illnesses. Quitting is hard, but there are ways to boost your chance of quitting tobacco for good. ? Use nicotine gum, patches, or lozenges. ? Ask your doctor about stop-smoking programs and medicines. ? Keep trying. In addition to reducing your risk of diseases in the future, you will notice some benefits soon after you stop using tobacco. If you have shortness of breath or asthma symptoms, they will likely get better within a few weeks after you quit. · Limit how much alcohol you drink. Moderate amounts of alcohol (up to 2 drinks a day for men, 1 drink a day for women) are okay. But drinking too much can lead to liver problems, high blood pressure, and other health problems. Family health If you have a family, there are many things you can do together to improve your health. · Eat meals together as a family as often as possible. · Eat healthy foods. This includes fruits, vegetables, lean meats and dairy, and whole grains. · Include your family in your fitness plan. Most people think of activities such as jogging or tennis as the way to fitness, but there are many ways you and your family can be more active. Anything that makes you breathe hard and gets your heart pumping is exercise. Here are some tips: 
? Walk to do errands or to take your child to school or the bus. 
? Go for a family bike ride after dinner instead of watching TV. Where can you learn more? Go to http://ramon-cathleen.info/ Enter I986 in the search box to learn more about \"A Healthy Lifestyle: Care Instructions. \" Current as of: January 31, 2020               Content Version: 12.5 © 2006-2020 [employer]. Care instructions adapted under license by [employer] (which disclaims liability or warranty for this information). If you have questions about a medical condition or this instruction, always ask your healthcare professional. [place] [address] any warranty or liability for your use of this information.

## 2020-06-03 NOTE — LETTER
6/3/2020 2:37 PM 
 
Mr. Perez Ax [id number] [facility] [id number] To Whom It May Concern, 
 
Due to chronic respiratory disease pt is at increase risk for severe disease from Covid-19. Please allow to work from home if possible. Sincerely, [address] [facility], DO

## 2020-06-03 NOTE — LETTER
6/3/2020 2:34 PM 
 
Mr. Elian Johnson [id number] [facility] [id number] To Whom It May Concern, 
 
Due to chronic respiratory disease pt is at increase risk for severe disease from Covid-19. Sincerely, [address] [facility], DO

## 2020-06-04 ENCOUNTER — TELEPHONE (OUTPATIENT)
Dept: FAMILY MEDICINE CLINIC | Age: 30
End: 2020-06-04

## 2020-06-04 NOTE — TELEPHONE ENCOUNTER
Patient would like a note during [employer] and having asthma he would like to work from home. He had a telemed appointment [phone] and said the letter was discussed. He would like to  the letter at the [facility]  Patient's phone: [phone]

## 2023-05-25 RX ORDER — METHOCARBAMOL 750 MG/1
750 TABLET, FILM COATED ORAL 3 TIMES DAILY PRN
COMMUNITY
Start: 2017-12-04

## 2023-05-25 RX ORDER — ALBUTEROL SULFATE 90 UG/1
2 AEROSOL, METERED RESPIRATORY (INHALATION) EVERY 4 HOURS PRN
COMMUNITY
Start: 2020-06-03

## 2023-05-25 RX ORDER — ERYTHROMYCIN 5 MG/G
OINTMENT OPHTHALMIC
COMMUNITY
Start: 2018-05-03

## 2023-05-25 RX ORDER — DICLOFENAC SODIUM 75 MG/1
75 TABLET, DELAYED RELEASE ORAL
COMMUNITY
Start: 2017-10-05